# Patient Record
Sex: FEMALE | Race: WHITE | NOT HISPANIC OR LATINO | URBAN - METROPOLITAN AREA
[De-identification: names, ages, dates, MRNs, and addresses within clinical notes are randomized per-mention and may not be internally consistent; named-entity substitution may affect disease eponyms.]

---

## 2020-07-25 ENCOUNTER — TELEPHONE ENCOUNTER (OUTPATIENT)
Dept: URBAN - METROPOLITAN AREA CLINIC 13 | Facility: CLINIC | Age: 55
End: 2020-07-25

## 2020-07-25 RX ORDER — HYDROXYCHLOROQUINE SULFATE 200 MG/1
TAKE 1 TABLET DAILY TABLET ORAL
Refills: 0 | OUTPATIENT
End: 2016-11-14

## 2020-07-25 RX ORDER — DULOXETINE HCL 30 MG
TAKE 1 CAPSULE DAILY CAPSULE,DELAYED RELEASE (ENTERIC COATED) ORAL
Refills: 0 | OUTPATIENT
End: 2016-11-14

## 2020-07-25 RX ORDER — ESOMEPRAZOLE MAGNESIUM 40 MG
TAKE 1 CAPSULE DAILY AS NEEDED CAPSULE,DELAYED RELEASE (ENTERIC COATED) ORAL
Refills: 0 | OUTPATIENT
End: 2020-02-13

## 2020-07-26 ENCOUNTER — TELEPHONE ENCOUNTER (OUTPATIENT)
Dept: URBAN - METROPOLITAN AREA CLINIC 13 | Facility: CLINIC | Age: 55
End: 2020-07-26

## 2020-07-26 RX ORDER — CETIRIZINE HYDROCHLORIDE 10 MG/1
TAKE 1 CAPSULE DAILY AS NEEDED CAPSULE, LIQUID FILLED ORAL
Refills: 0 | Status: ACTIVE | COMMUNITY

## 2020-07-26 RX ORDER — RIFAXIMIN 550 MG/1
TAKE 1 TABLET 3 TIMES DAILY TABLET ORAL
Qty: 42 | Refills: 0 | Status: ACTIVE | COMMUNITY
Start: 2020-02-13

## 2020-07-26 RX ORDER — CALCIUM CITRATE/VITAMIN D3 315MG-6.25
TAKE 1 TABLET DAILY TABLET ORAL
Refills: 0 | Status: ACTIVE | COMMUNITY

## 2020-07-26 RX ORDER — PREDNISONE 20 MG/1
TAKE 3 TABLETS BY MOUTH ONCE DAILY FOR 3 DAYS THEN 2 TABLETS DAILY FOR TABLET ORAL
Qty: 18 | Refills: 0 | Status: ACTIVE | COMMUNITY
Start: 2019-12-05

## 2020-07-26 RX ORDER — FLUCONAZOLE 200 MG/1
TAKE 1 TABLET BY MOUTH ONCE DAILY TABLET ORAL
Qty: 14 | Refills: 0 | Status: ACTIVE | COMMUNITY
Start: 2019-12-30

## 2020-07-26 RX ORDER — CLOBETASOL PROPIONATE 0.5 MG/G
CREAM TOPICAL
Qty: 60 | Refills: 0 | Status: ACTIVE | COMMUNITY
Start: 2020-02-20

## 2020-07-26 RX ORDER — CYCLOBENZAPRINE HYDROCHLORIDE 10 MG/1
TABLET, FILM COATED ORAL
Qty: 30 | Refills: 0 | Status: ACTIVE | COMMUNITY
Start: 2020-03-27

## 2020-07-26 RX ORDER — CLOBETASOL PROPIONATE 0.5 MG/G
APPLY 1 GRAM TOPICALLY TO AFFECTED AREA TWICE DAILY CREAM TOPICAL
Qty: 30 | Refills: 0 | Status: ACTIVE | COMMUNITY
Start: 2019-08-07

## 2020-07-26 RX ORDER — AMOXICILLIN AND CLAVULANATE POTASSIUM 500; 125 MG/1; MG/1
TAKE 1 TABLET BY MOUTH TWICE DAILY TABLET, FILM COATED ORAL
Qty: 28 | Refills: 0 | Status: ACTIVE | COMMUNITY
Start: 2019-09-01

## 2020-07-26 RX ORDER — CHLORHEXIDINE GLUCONATE 4 %
TAKE 1 TABLET DAILY AS DIRECTED LIQUID (ML) TOPICAL
Refills: 0 | Status: ACTIVE | COMMUNITY

## 2021-10-14 ENCOUNTER — LAB OUTSIDE AN ENCOUNTER (OUTPATIENT)
Dept: URBAN - METROPOLITAN AREA CLINIC 113 | Facility: CLINIC | Age: 56
End: 2021-10-14

## 2021-10-14 ENCOUNTER — TELEPHONE ENCOUNTER (OUTPATIENT)
Dept: URBAN - METROPOLITAN AREA CLINIC 113 | Facility: CLINIC | Age: 56
End: 2021-10-14

## 2021-10-14 VITALS — HEIGHT: 62 IN | WEIGHT: 220 LBS | BODY MASS INDEX: 40.48 KG/M2

## 2021-10-14 RX ORDER — CETIRIZINE HYDROCHLORIDE 10 MG/1
TAKE 1 CAPSULE DAILY AS NEEDED CAPSULE, LIQUID FILLED ORAL
Refills: 0 | Status: ACTIVE | COMMUNITY

## 2021-10-14 RX ORDER — CLOBETASOL PROPIONATE 0.5 MG/G
APPLY 1 GRAM TOPICALLY TO AFFECTED AREA TWICE DAILY CREAM TOPICAL
Qty: 30 | Refills: 0 | Status: DISCONTINUED | COMMUNITY
Start: 2019-08-07

## 2021-10-14 RX ORDER — CYCLOBENZAPRINE HYDROCHLORIDE 10 MG/1
TABLET, FILM COATED ORAL
Qty: 30 | Refills: 0 | Status: DISCONTINUED | COMMUNITY
Start: 2020-03-27

## 2021-10-14 RX ORDER — CHLORHEXIDINE GLUCONATE 4 %
TAKE 1 TABLET DAILY AS DIRECTED LIQUID (ML) TOPICAL
Refills: 0 | Status: ACTIVE | COMMUNITY

## 2021-10-14 RX ORDER — CALCIUM CITRATE/VITAMIN D3 315MG-6.25
TAKE 1 TABLET DAILY TABLET ORAL
Refills: 0 | Status: DISCONTINUED | COMMUNITY

## 2021-10-14 RX ORDER — AMOXICILLIN AND CLAVULANATE POTASSIUM 500; 125 MG/1; MG/1
TAKE 1 TABLET BY MOUTH TWICE DAILY TABLET, FILM COATED ORAL
Qty: 28 | Refills: 0 | Status: DISCONTINUED | COMMUNITY
Start: 2019-09-01

## 2021-10-14 RX ORDER — PREDNISONE 20 MG/1
TAKE 3 TABLETS BY MOUTH ONCE DAILY FOR 3 DAYS THEN 2 TABLETS DAILY FOR TABLET ORAL
Qty: 18 | Refills: 0 | Status: DISCONTINUED | COMMUNITY
Start: 2019-12-05

## 2021-10-14 RX ORDER — FLUCONAZOLE 200 MG/1
TAKE 1 TABLET BY MOUTH ONCE DAILY TABLET ORAL
Qty: 14 | Refills: 0 | Status: ACTIVE | COMMUNITY
Start: 2019-12-30

## 2021-10-14 RX ORDER — SODIUM PICOSULFATE, MAGNESIUM OXIDE, AND ANHYDROUS CITRIC ACID 10; 3.5; 12 MG/160ML; G/160ML; G/160ML
160ML LIQUID ORAL
Qty: 1 BOTTLE | Refills: 0 | OUTPATIENT
Start: 2021-10-14 | End: 2021-10-15

## 2021-10-14 RX ORDER — RIFAXIMIN 550 MG/1
TAKE 1 TABLET 3 TIMES DAILY TABLET ORAL
Qty: 42 | Refills: 0 | Status: DISCONTINUED | COMMUNITY
Start: 2020-02-13

## 2021-10-19 ENCOUNTER — TELEPHONE ENCOUNTER (OUTPATIENT)
Dept: URBAN - METROPOLITAN AREA CLINIC 113 | Facility: CLINIC | Age: 56
End: 2021-10-19

## 2021-10-19 RX ORDER — FLUCONAZOLE 200 MG/1
TAKE 1 TABLET BY MOUTH ONCE DAILY TABLET ORAL
Qty: 14 | Refills: 0 | Status: ACTIVE | COMMUNITY
Start: 2019-12-30

## 2021-10-19 RX ORDER — CETIRIZINE HYDROCHLORIDE 10 MG/1
TAKE 1 CAPSULE DAILY AS NEEDED CAPSULE, LIQUID FILLED ORAL
Refills: 0 | Status: ACTIVE | COMMUNITY

## 2021-10-19 RX ORDER — CHLORHEXIDINE GLUCONATE 4 %
TAKE 1 TABLET DAILY AS DIRECTED LIQUID (ML) TOPICAL
Refills: 0 | Status: ACTIVE | COMMUNITY

## 2021-10-19 RX ORDER — POLYETHYLENE GLYCOL 3350, SODIUM CHLORIDE, SODIUM BICARBONATE, POTASSIUM CHLORIDE 420; 11.2; 5.72; 1.48 G/4L; G/4L; G/4L; G/4L
AS DIRECTED POWDER, FOR SOLUTION ORAL ONCE
Qty: 420 GM | Refills: 0 | OUTPATIENT
Start: 2021-10-19 | End: 2021-10-20

## 2021-11-22 ENCOUNTER — TELEPHONE ENCOUNTER (OUTPATIENT)
Dept: URBAN - METROPOLITAN AREA CLINIC 113 | Facility: CLINIC | Age: 56
End: 2021-11-22

## 2021-11-22 ENCOUNTER — OFFICE VISIT (OUTPATIENT)
Dept: URBAN - METROPOLITAN AREA SURGERY CENTER 25 | Facility: SURGERY CENTER | Age: 56
End: 2021-11-22

## 2021-11-24 ENCOUNTER — TELEPHONE ENCOUNTER (OUTPATIENT)
Dept: URBAN - METROPOLITAN AREA CLINIC 113 | Facility: CLINIC | Age: 56
End: 2021-11-24

## 2021-11-24 RX ORDER — POLYETHYLENE GLYCOL 3350, SODIUM CHLORIDE, SODIUM BICARBONATE, POTASSIUM CHLORIDE 420; 11.2; 5.72; 1.48 G/4L; G/4L; G/4L; G/4L
AS DIRECTED POWDER, FOR SOLUTION ORAL ONCE
Qty: 420 GM | Refills: 0 | OUTPATIENT

## 2021-12-22 ENCOUNTER — OFFICE VISIT (OUTPATIENT)
Dept: URBAN - METROPOLITAN AREA SURGERY CENTER 25 | Facility: SURGERY CENTER | Age: 56
End: 2021-12-22
Payer: COMMERCIAL

## 2021-12-22 DIAGNOSIS — K63.89 BACTERIAL OVERGROWTH SYNDROME: ICD-10-CM

## 2021-12-22 DIAGNOSIS — Z80.0 FAMILY HISTORY OF MALIGNANT NEOPLASM OF DIGESTIVE ORGANS: ICD-10-CM

## 2021-12-22 DIAGNOSIS — D12.0 ADENOMA OF CECUM: ICD-10-CM

## 2021-12-22 DIAGNOSIS — Z12.11 ENCOUNTER FOR SCREENING FOR MALIGNANT NEOPLASM OF COLON: ICD-10-CM

## 2021-12-22 PROCEDURE — G8907 PT DOC NO EVENTS ON DISCHARG: HCPCS | Performed by: INTERNAL MEDICINE

## 2021-12-22 PROCEDURE — 45385 COLONOSCOPY W/LESION REMOVAL: CPT | Performed by: INTERNAL MEDICINE

## 2021-12-22 PROCEDURE — 45380 COLONOSCOPY AND BIOPSY: CPT | Performed by: INTERNAL MEDICINE

## 2021-12-22 RX ORDER — FLUCONAZOLE 200 MG/1
TAKE 1 TABLET BY MOUTH ONCE DAILY TABLET ORAL
Qty: 14 | Refills: 0 | Status: ACTIVE | COMMUNITY
Start: 2019-12-30

## 2021-12-22 RX ORDER — POLYETHYLENE GLYCOL 3350, SODIUM CHLORIDE, SODIUM BICARBONATE, POTASSIUM CHLORIDE 420; 11.2; 5.72; 1.48 G/4L; G/4L; G/4L; G/4L
AS DIRECTED POWDER, FOR SOLUTION ORAL ONCE
Qty: 420 GM | Refills: 0 | Status: ACTIVE | COMMUNITY

## 2021-12-22 RX ORDER — CETIRIZINE HYDROCHLORIDE 10 MG/1
TAKE 1 CAPSULE DAILY AS NEEDED CAPSULE, LIQUID FILLED ORAL
Refills: 0 | Status: ACTIVE | COMMUNITY

## 2021-12-22 RX ORDER — CHLORHEXIDINE GLUCONATE 4 %
TAKE 1 TABLET DAILY AS DIRECTED LIQUID (ML) TOPICAL
Refills: 0 | Status: ACTIVE | COMMUNITY

## 2022-12-13 ENCOUNTER — LAB OUTSIDE AN ENCOUNTER (OUTPATIENT)
Dept: URBAN - METROPOLITAN AREA CLINIC 113 | Facility: CLINIC | Age: 57
End: 2022-12-13

## 2022-12-13 ENCOUNTER — OFFICE VISIT (OUTPATIENT)
Dept: URBAN - METROPOLITAN AREA CLINIC 113 | Facility: CLINIC | Age: 57
End: 2022-12-13
Payer: COMMERCIAL

## 2022-12-13 VITALS
HEART RATE: 77 BPM | HEIGHT: 62 IN | BODY MASS INDEX: 37.76 KG/M2 | WEIGHT: 205.2 LBS | RESPIRATION RATE: 20 BRPM | SYSTOLIC BLOOD PRESSURE: 127 MMHG | TEMPERATURE: 97.2 F | DIASTOLIC BLOOD PRESSURE: 81 MMHG

## 2022-12-13 DIAGNOSIS — R79.89 ELEVATED LFTS: ICD-10-CM

## 2022-12-13 DIAGNOSIS — R11.0 NAUSEA: ICD-10-CM

## 2022-12-13 DIAGNOSIS — R10.11 RUQ PAIN: ICD-10-CM

## 2022-12-13 DIAGNOSIS — K59.00 CONSTIPATION, UNSPECIFIED CONSTIPATION TYPE: ICD-10-CM

## 2022-12-13 PROCEDURE — 99214 OFFICE O/P EST MOD 30 MIN: CPT

## 2022-12-13 RX ORDER — FLUCONAZOLE 200 MG/1
TAKE 1 TABLET BY MOUTH ONCE DAILY TABLET ORAL
Qty: 14 | Refills: 0 | Status: ACTIVE | COMMUNITY
Start: 2019-12-30

## 2022-12-13 RX ORDER — ONDANSETRON 4 MG/1
1 TABLET ON THE TONGUE AND ALLOW TO DISSOLVE TABLET, ORALLY DISINTEGRATING ORAL
Qty: 30 | Refills: 0 | OUTPATIENT
Start: 2022-12-13

## 2022-12-13 RX ORDER — LINACLOTIDE 72 UG/1
1 CAPSULE AT LEAST 30 MINUTES BEFORE THE FIRST MEAL OF THE DAY ON AN EMPTY STOMACH CAPSULE, GELATIN COATED ORAL ONCE A DAY
Qty: 24 | Refills: 0
Start: 2022-12-13 | End: 2023-01-06

## 2022-12-13 RX ORDER — CETIRIZINE HYDROCHLORIDE 10 MG/1
TAKE 1 CAPSULE DAILY AS NEEDED CAPSULE, LIQUID FILLED ORAL
Refills: 0 | Status: ACTIVE | COMMUNITY

## 2022-12-13 RX ORDER — CHLORHEXIDINE GLUCONATE 4 %
TAKE 1 TABLET DAILY AS DIRECTED LIQUID (ML) TOPICAL
Refills: 0 | Status: ON HOLD | COMMUNITY

## 2022-12-13 RX ORDER — POLYETHYLENE GLYCOL 3350, SODIUM CHLORIDE, SODIUM BICARBONATE, POTASSIUM CHLORIDE 420; 11.2; 5.72; 1.48 G/4L; G/4L; G/4L; G/4L
AS DIRECTED POWDER, FOR SOLUTION ORAL ONCE
Qty: 420 GM | Refills: 0 | Status: ON HOLD | COMMUNITY

## 2022-12-13 NOTE — HPI-TODAY'S VISIT:
Ms. Argueta is a 57-year-old woman presenting for evaluation of nausea and abdominal pain.  Normal last seen 12/22/2021 for direct access colonoscopy.  Notable for removal of 1 small polyp in the cecum, and 1 small polyp in the sigmoid, diverticulosis of the sigmoid, grade 1 internal nonbleeding hemorrhoids.  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  Repeat colonoscopy in 5 years.  [place].  Per note from primary care, abdominal pain had subsided but liver enzymes were elevated.  Elevated liver enzyme work-up deferred to GI, and trial sucralfate for nausea.  12/7/2022 labs:CBC within normal limits, CMP within normal limits, EBV antibody IgG elevated, EBV nuclear antigen antibody IgG elevated, LAUREN negative, hep A IgM negative, hep B surface antigen negative, hep B core antibody negative, hep C antibody negative.  She reports on 11/11/2022 she was seen at [facility] ER.  She woke up at 3:30 in the morning with severe abdominal pain and ended up visiting the emergency room.  She was evaluated with right upper quadrant ultrasound and CT abdomen pelvis with contrast.  Exam was most consistent with gallbladder etiology.  She had a positive [place] sign. Her liver enzymes and bilirubin were mildly elevated during workup. She was evaluated by surgery, and agreed to outpatient follow-up with imaging versus gallbladder removal.  RUQ US: 1.9 x 1.7 x 2.1 cm echogenic focus within right liver, likely represents hemangioma, consider CT abd with and without, normal sonographic evaluation of gallbladder, CBD 8 mm CT abd/pelvis with contrast: echogenic focus within right lobe of the liver corresponding to hemangioma, several hepatic cysts, no acute abnormality   11/11/22 labs: t bili 1.9, , ,   She reports RUQ pain has resolved for the most part aside from some mild tenderness with palpation. Nausea only right after eating, but no vomiting. She reports constipation with a bowel movement every 3-4 days with straining, despite using MiraLAX capful daily. Denies blood, mucous. She contributes constipation to new diet, Optavia, which is a soy based diet.

## 2022-12-29 ENCOUNTER — WEB ENCOUNTER (OUTPATIENT)
Dept: URBAN - METROPOLITAN AREA CLINIC 113 | Facility: CLINIC | Age: 57
End: 2022-12-29

## 2022-12-29 RX ORDER — PLECANATIDE 3 MG/1
1 TABLET TABLET ORAL ONCE A DAY
Qty: 30 | Refills: 0 | OUTPATIENT
Start: 2023-01-04 | End: 2023-02-03

## 2023-01-08 ENCOUNTER — WEB ENCOUNTER (OUTPATIENT)
Dept: URBAN - METROPOLITAN AREA CLINIC 113 | Facility: CLINIC | Age: 58
End: 2023-01-08

## 2023-01-11 ENCOUNTER — TELEPHONE ENCOUNTER (OUTPATIENT)
Dept: URBAN - METROPOLITAN AREA CLINIC 113 | Facility: CLINIC | Age: 58
End: 2023-01-11

## 2023-01-24 ENCOUNTER — LAB OUTSIDE AN ENCOUNTER (OUTPATIENT)
Dept: URBAN - METROPOLITAN AREA CLINIC 113 | Facility: CLINIC | Age: 58
End: 2023-01-24

## 2023-01-24 ENCOUNTER — OFFICE VISIT (OUTPATIENT)
Dept: URBAN - METROPOLITAN AREA CLINIC 113 | Facility: CLINIC | Age: 58
End: 2023-01-24
Payer: COMMERCIAL

## 2023-01-24 VITALS
DIASTOLIC BLOOD PRESSURE: 79 MMHG | TEMPERATURE: 97.6 F | HEIGHT: 62 IN | WEIGHT: 206 LBS | RESPIRATION RATE: 18 BRPM | SYSTOLIC BLOOD PRESSURE: 125 MMHG | HEART RATE: 77 BPM | BODY MASS INDEX: 37.91 KG/M2

## 2023-01-24 DIAGNOSIS — K59.00 CONSTIPATION, UNSPECIFIED CONSTIPATION TYPE: ICD-10-CM

## 2023-01-24 DIAGNOSIS — R10.10 PAIN OF UPPER ABDOMEN: ICD-10-CM

## 2023-01-24 DIAGNOSIS — R11.0 NAUSEA: ICD-10-CM

## 2023-01-24 DIAGNOSIS — K76.89 LIVER CYST: ICD-10-CM

## 2023-01-24 PROBLEM — 85057007: Status: ACTIVE | Noted: 2023-01-24

## 2023-01-24 PROBLEM — 14760008: Status: ACTIVE | Noted: 2022-12-13

## 2023-01-24 PROCEDURE — 99214 OFFICE O/P EST MOD 30 MIN: CPT

## 2023-01-24 RX ORDER — CHLORHEXIDINE GLUCONATE 4 %
TAKE 1 TABLET DAILY AS DIRECTED LIQUID (ML) TOPICAL
Refills: 0 | Status: ON HOLD | COMMUNITY

## 2023-01-24 RX ORDER — PLECANATIDE 3 MG/1
1 TABLET TABLET ORAL ONCE A DAY
Qty: 30 | Refills: 0 | Status: ON HOLD | COMMUNITY
Start: 2023-01-04 | End: 2023-02-03

## 2023-01-24 RX ORDER — CETIRIZINE HYDROCHLORIDE 10 MG/1
TAKE 1 CAPSULE DAILY AS NEEDED CAPSULE, LIQUID FILLED ORAL
Refills: 0 | Status: ACTIVE | COMMUNITY

## 2023-01-24 RX ORDER — FLUCONAZOLE 200 MG/1
TAKE 1 TABLET BY MOUTH ONCE DAILY TABLET ORAL
Qty: 14 | Refills: 0 | Status: ON HOLD | COMMUNITY
Start: 2019-12-30

## 2023-01-24 RX ORDER — ONDANSETRON 4 MG/1
1 TABLET ON THE TONGUE AND ALLOW TO DISSOLVE TABLET, ORALLY DISINTEGRATING ORAL
Qty: 30 | Refills: 0 | Status: ON HOLD | COMMUNITY
Start: 2022-12-13

## 2023-01-24 RX ORDER — POLYETHYLENE GLYCOL 3350, SODIUM CHLORIDE, SODIUM BICARBONATE, POTASSIUM CHLORIDE 420; 11.2; 5.72; 1.48 G/4L; G/4L; G/4L; G/4L
AS DIRECTED POWDER, FOR SOLUTION ORAL ONCE
Qty: 420 GM | Refills: 0 | Status: ON HOLD | COMMUNITY

## 2023-01-24 NOTE — HPI-TODAY'S VISIT:
Ms. Landin is a 57-year-old woman presenting for a follow-up after cholecystectomy. Last seen 12/13/2022 for hospital follow-up for right upper quadrant pain.  Evaluation with right upper quadrant ultrasound and CT abdomen and pelvis most consistent with gallbladder etiology.  She was consulted for surgery, but decided to continue work-up outpatient versus a cholecystectomy.  Recommended CT scan, liver protocol, to evaluate liver cyst/meningioma noted on ultrasound and CT.  Recommended HIDA to evaluate gallbladder function and bile ducts.  Regarding elevated LFTs in the hospital, repeat labs from 12/7/2022 showed normalized levels.  We will repeat labs in 3 months to ensure liver enzymes stay normalized.  Regarding constipation, trial Linzess 72 mcg daily.  Trial Zofran for nausea. 12/20/2022 HIDA scan:No evidence of acute cholecystitis, but gallbladder ejection fraction below normal limits at 28%. Repeat CT scan, liver protocol was denied by insurance. Patient presents today to follow-up.  She had her gallbladder removed 1/13/2023, but reports still feeling the same.  She reports pain in the left upper quadrant that spreads to the right upper quadrant.  She feels nauseated all the time and occasionally vomit.  The pain occurs primarily and not only with eating.  She has not taking Zofran because it knocks her out, so she has just been living with the symptoms.  She has never had an EGD.  No history of diabetes. She reports her bowel movements have been very constipated.  She has had to disimpact herself on 2 occasions since the surgery.  She tried Trulance 3 mg and it caused "massive liquid diarrhea ", which is the same reaction she had with Linzess 72 mcg.  She reports firm hard stool when she has a bowel movement with no blood, mucus.  MiraLAX works better than the prescription medications, but it still not great.  She reports the same with milk of magnesia.

## 2023-01-25 ENCOUNTER — TELEPHONE ENCOUNTER (OUTPATIENT)
Dept: URBAN - METROPOLITAN AREA CLINIC 113 | Facility: CLINIC | Age: 58
End: 2023-01-25

## 2023-02-22 ENCOUNTER — TELEPHONE ENCOUNTER (OUTPATIENT)
Dept: URBAN - METROPOLITAN AREA CLINIC 113 | Facility: CLINIC | Age: 58
End: 2023-02-22

## 2023-03-09 ENCOUNTER — OFFICE VISIT (OUTPATIENT)
Dept: URBAN - METROPOLITAN AREA SURGERY CENTER 25 | Facility: SURGERY CENTER | Age: 58
End: 2023-03-09

## 2023-04-07 ENCOUNTER — OFFICE VISIT (OUTPATIENT)
Dept: URBAN - METROPOLITAN AREA CLINIC 113 | Facility: CLINIC | Age: 58
End: 2023-04-07

## 2023-12-14 ENCOUNTER — LAB OUTSIDE AN ENCOUNTER (OUTPATIENT)
Dept: URBAN - METROPOLITAN AREA CLINIC 107 | Facility: CLINIC | Age: 58
End: 2023-12-14

## 2023-12-14 ENCOUNTER — CLAIMS CREATED FROM THE CLAIM WINDOW (OUTPATIENT)
Dept: URBAN - METROPOLITAN AREA CLINIC 107 | Facility: CLINIC | Age: 58
End: 2023-12-14
Payer: COMMERCIAL

## 2023-12-14 VITALS
TEMPERATURE: 98.2 F | WEIGHT: 187 LBS | HEIGHT: 62 IN | BODY MASS INDEX: 34.41 KG/M2 | HEART RATE: 80 BPM | SYSTOLIC BLOOD PRESSURE: 137 MMHG | DIASTOLIC BLOOD PRESSURE: 79 MMHG | RESPIRATION RATE: 18 BRPM

## 2023-12-14 DIAGNOSIS — R10.11 RIGHT UPPER QUADRANT PAIN: ICD-10-CM

## 2023-12-14 DIAGNOSIS — R11.0 NAUSEA: ICD-10-CM

## 2023-12-14 DIAGNOSIS — K76.89 HEPATIC CYST: ICD-10-CM

## 2023-12-14 DIAGNOSIS — R10.13 EPIGASTRIC PAIN: ICD-10-CM

## 2023-12-14 PROBLEM — 85057007: Status: ACTIVE | Noted: 2023-12-14

## 2023-12-14 PROCEDURE — 99214 OFFICE O/P EST MOD 30 MIN: CPT

## 2023-12-14 RX ORDER — RABEPRAZOLE SODIUM 20 MG/1
1 TABLET TABLET, DELAYED RELEASE ORAL ONCE A DAY
Qty: 90 TABLET | Refills: 1 | OUTPATIENT
Start: 2023-12-14

## 2023-12-14 RX ORDER — CETIRIZINE HYDROCHLORIDE 10 MG/1
TAKE 1 CAPSULE DAILY AS NEEDED CAPSULE, LIQUID FILLED ORAL
Refills: 0 | Status: ACTIVE | COMMUNITY

## 2023-12-14 NOTE — HPI-OTHER HISTORIES
12/20/2022 HIDA scan:No evidence of acute cholecystitis, but gallbladder ejection fraction below normal limits at 28%.Repeat CT scan, liver protocol was denied by insurance. CT abdomen pelvis with contrast from 11/11/2022 showed a 2.5 cm cyst lateral segment left lobe liver.  low attenuation foci within both right and left lobes of the liver. Felt likely to be small cyst. 1.8 cm Attenuation focus right lobe of the liver corresponding to the echogenic focus seen on the ultrasound with some peripheral puddling of contrast consistent with cavernous hemangioma.

## 2023-12-14 NOTE — HPI-TODAY'S VISIT:
Ms. Landin is a 57-year-old woman presenting for long interval follow-up for nausea and liver cysts.   She was last seen 1/24/2023 for follow-up after cholecystectomy (January 13, 2023).  At that point she reported no change in her symptoms she was still experiencing upper abdominal pain and nausea.  A 4-hour gastric imaging study was ordered to rule out gastroparesis or delayed gastric emptying.  EGD was ordered to rule out GERD, PUD, H. pylori.  Patient was offered antiemetic medication but she declined as she felt it made her feel tired and drowsy.  As far as her constipation she was recommend MiraLAX 1 capful daily with 15 cc Milk of Magnesia every other night.  She had tried Linzess 72 mcg and 23 mg which she reported close "too much for her system".  Regarding liver cyst that were noted on CT and ultrasoun there was no further imaging required at that time.  Her liver enzymes had returned to normal labs were planned to be repeated in 1 month from that visit.   It appears she canceled her EGD that was scheduled to be completed in March 2023.  Today she presents with continued complaints of nausea and abdominal pain.  She reports in May 2023 that she had a sleeve gastrectomy.  They started her on Pepcid but it was not helpful for her symptoms.  She was reports frustration as she has not lost any weight that she feels is that she is not eating anything. She feels bloated at times. She notices with her constipation and a trial of MiraLAX she will have "blowout diarrhea".  She has started drinking colostrum cow powder and reports her bowels are moving regularly. She endorses occasionally blood per rectum after firm bowel bowel movement with straining.  She also wanted us to note that she has seen her OB/GYN as during intercourse her  said he felt something stabbing him and her OB/GYN reported that she does have either mesh or stitch in that area. She had her gastric emptying study on February 7, 2023 that was reported normal. She presents today with her CT abdomen pelvis with contrast completed 11/9/2023 that revealed multiple hepatic cysts.  Subcentimeter hypodensities that are too small to characterize.  Avidly enhancing, heterogenous right hepatic lobe lesion measuring 2.4 x 2.3 cm.  This was suspected to be a hepatic hemangioma.  It was recommended further evaluation with nonemergent/outpatient MRI abdomen with and without contrast liver protocol.  Normal pancreas.  GI with scattered colonic diverticula.  Paracervicals changes of a sleeve gastrectomy.  Tiny hiatal hernia.

## 2023-12-16 ENCOUNTER — LAB OUTSIDE AN ENCOUNTER (OUTPATIENT)
Dept: URBAN - METROPOLITAN AREA CLINIC 107 | Facility: CLINIC | Age: 58
End: 2023-12-16

## 2023-12-20 ENCOUNTER — CLAIMS CREATED FROM THE CLAIM WINDOW (OUTPATIENT)
Dept: URBAN - METROPOLITAN AREA SURGERY CENTER 25 | Facility: SURGERY CENTER | Age: 58
End: 2023-12-20
Payer: COMMERCIAL

## 2023-12-20 ENCOUNTER — CLAIMS CREATED FROM THE CLAIM WINDOW (OUTPATIENT)
Dept: URBAN - METROPOLITAN AREA SURGERY CENTER 25 | Facility: SURGERY CENTER | Age: 58
End: 2023-12-20

## 2023-12-20 ENCOUNTER — CLAIMS CREATED FROM THE CLAIM WINDOW (OUTPATIENT)
Dept: URBAN - METROPOLITAN AREA CLINIC 4 | Facility: CLINIC | Age: 58
End: 2023-12-20
Payer: COMMERCIAL

## 2023-12-20 DIAGNOSIS — K31.89 OTHER DISEASES OF STOMACH AND DUODENUM: ICD-10-CM

## 2023-12-20 DIAGNOSIS — K44.9 HIATAL HERNIA: ICD-10-CM

## 2023-12-20 DIAGNOSIS — K31.9 ERYTHEMA OF GASTRIC ANTRUM: ICD-10-CM

## 2023-12-20 DIAGNOSIS — R11.0 NAUSEA: ICD-10-CM

## 2023-12-20 DIAGNOSIS — R10.13 EPIGASTRIC ABDOMINAL PAIN: ICD-10-CM

## 2023-12-20 DIAGNOSIS — K22.89 OTHER SPECIFIED DISEASES OF ESOPHAGUS: ICD-10-CM

## 2023-12-20 DIAGNOSIS — Z98.84 BARIATRIC SURGERY STATUS: ICD-10-CM

## 2023-12-20 PROCEDURE — G8907 PT DOC NO EVENTS ON DISCHARG: HCPCS | Performed by: INTERNAL MEDICINE

## 2023-12-20 PROCEDURE — 00731 ANES UPR GI NDSC PX NOS: CPT | Performed by: NURSE ANESTHETIST, CERTIFIED REGISTERED

## 2023-12-20 PROCEDURE — 88312 SPECIAL STAINS GROUP 1: CPT | Performed by: PATHOLOGY

## 2023-12-20 PROCEDURE — 43239 EGD BIOPSY SINGLE/MULTIPLE: CPT | Performed by: INTERNAL MEDICINE

## 2023-12-20 PROCEDURE — 88305 TISSUE EXAM BY PATHOLOGIST: CPT | Performed by: PATHOLOGY

## 2023-12-20 PROCEDURE — 00731 ANES UPR GI NDSC PX NOS: CPT | Performed by: ANESTHESIOLOGY

## 2023-12-20 RX ORDER — RABEPRAZOLE SODIUM 20 MG/1
1 TABLET TABLET, DELAYED RELEASE ORAL ONCE A DAY
Qty: 90 TABLET | Refills: 1 | Status: ACTIVE | COMMUNITY
Start: 2023-12-14

## 2023-12-20 RX ORDER — CETIRIZINE HYDROCHLORIDE 10 MG/1
TAKE 1 CAPSULE DAILY AS NEEDED CAPSULE, LIQUID FILLED ORAL
Refills: 0 | Status: ACTIVE | COMMUNITY

## 2023-12-28 ENCOUNTER — OFFICE VISIT (OUTPATIENT)
Dept: URBAN - METROPOLITAN AREA SURGERY CENTER 25 | Facility: SURGERY CENTER | Age: 58
End: 2023-12-28

## 2024-02-01 ENCOUNTER — OV EP (OUTPATIENT)
Dept: URBAN - METROPOLITAN AREA CLINIC 107 | Facility: CLINIC | Age: 59
End: 2024-02-01
Payer: COMMERCIAL

## 2024-02-01 VITALS — BODY MASS INDEX: 34.41 KG/M2 | HEIGHT: 62 IN | WEIGHT: 187 LBS | TEMPERATURE: 97.8 F

## 2024-02-01 DIAGNOSIS — R11.0 NAUSEA: ICD-10-CM

## 2024-02-01 DIAGNOSIS — K76.89 HEPATIC CYST: ICD-10-CM

## 2024-02-01 DIAGNOSIS — R10.13 EPIGASTRIC PAIN: ICD-10-CM

## 2024-02-01 DIAGNOSIS — R10.11 RIGHT UPPER QUADRANT PAIN: ICD-10-CM

## 2024-02-01 PROCEDURE — 99214 OFFICE O/P EST MOD 30 MIN: CPT

## 2024-02-01 RX ORDER — CETIRIZINE HYDROCHLORIDE 10 MG/1
TAKE 1 CAPSULE DAILY AS NEEDED CAPSULE, LIQUID FILLED ORAL
Refills: 0 | Status: ACTIVE | COMMUNITY

## 2024-02-01 RX ORDER — LORATADINE 10 MG/1
1 TABLET TABLET ORAL ONCE A DAY
Status: ACTIVE | COMMUNITY

## 2024-02-01 RX ORDER — AMITRIPTYLINE HYDROCHLORIDE 10 MG/1
1 TABLET AT BEDTIME FOR 7 DAYS THEN INCREASE TO 2 TABLETS TABLET, FILM COATED ORAL
Qty: 60 | Refills: 0 | OUTPATIENT
Start: 2024-02-04

## 2024-02-01 RX ORDER — RABEPRAZOLE SODIUM 20 MG/1
1 TABLET TABLET, DELAYED RELEASE ORAL ONCE A DAY
Qty: 90 TABLET | Refills: 1 | Status: ACTIVE | COMMUNITY
Start: 2023-12-14

## 2024-02-01 RX ORDER — RABEPRAZOLE SODIUM 20 MG/1
1 TABLET TABLET, DELAYED RELEASE ORAL ONCE A DAY
Qty: 90 TABLET | Refills: 1 | OUTPATIENT

## 2024-02-01 RX ORDER — MULTIVIT-MIN/IRON/FOLIC ACID/K 45-800-120
AS DIRECTED CAPSULE ORAL
Status: ACTIVE | COMMUNITY

## 2024-02-01 NOTE — HPI-TODAY'S VISIT:
Ms. Argueta is a 58-year-old woman with a history of lupus, dyslipidemia, fibromyalgia, diverticulosis, and internal hemorrhoids presenting for follow-up regarding her nausea and epigastric pain.  She was last seen in office 12/14/2023 for evaluation regarding nausea and epigastric pain.  I recommend that she reschedule her EGD for evaluation.  That was suspected there is a functional component with her symptoms.  She was also to start rabeprazole 20 mg p.o. daily.  She declined antiemetics at this time.  She also presented with a recent CT imaging that showed multiple subcentimeter hepatic cysts and a 2 and half centimeter lesion in the right hepatic lobe consider be hemangioma.  Similar to previous CT a/p from 1 year prior.  No further imaging was required with patient [facility] MRI.  This was ordered Her EGD performed 12/20/2023 irregular Z-line otherwise unremarkable esophagus.  Small hiatal hernia.  Changes of a sleeve gastrectomy was found.  Nonerosive gastropathy this was biopsied.  Normal duodenum pathology revealed foveolar hyperplasia.  No evidence of H. pylori or intestinal metaplasia.  Her MRI abdomen with and without contrast completed on 12/28/2023 showed previously described right lobe lesion is a segment 6 hemangioma measuring 1.9 x 1.5 cm several hepatic cysts, largest in segment 4 measuring to 2.5 cm. Today she reports no improvement in her symptoms. Constantly nauseaous and unable to eat. Her weight has been stable. Bowels are moving regularly without blood per rectum or melena. She reports she has not followed up with bariatric surgery in 8 months.

## 2024-05-02 ENCOUNTER — OFFICE VISIT (OUTPATIENT)
Dept: URBAN - METROPOLITAN AREA CLINIC 107 | Facility: CLINIC | Age: 59
End: 2024-05-02

## 2024-06-06 ENCOUNTER — DASHBOARD ENCOUNTERS (OUTPATIENT)
Age: 59
End: 2024-06-06

## 2024-06-06 ENCOUNTER — OFFICE VISIT (OUTPATIENT)
Dept: URBAN - METROPOLITAN AREA CLINIC 107 | Facility: CLINIC | Age: 59
End: 2024-06-06
Payer: COMMERCIAL

## 2024-06-06 VITALS
HEIGHT: 62 IN | SYSTOLIC BLOOD PRESSURE: 109 MMHG | TEMPERATURE: 98.1 F | WEIGHT: 192 LBS | DIASTOLIC BLOOD PRESSURE: 73 MMHG | HEART RATE: 80 BPM | BODY MASS INDEX: 35.33 KG/M2

## 2024-06-06 DIAGNOSIS — R10.11 RIGHT UPPER QUADRANT PAIN: ICD-10-CM

## 2024-06-06 DIAGNOSIS — R10.13 EPIGASTRIC PAIN: ICD-10-CM

## 2024-06-06 DIAGNOSIS — K59.09 CHANGE IN BOWEL MOVEMENTS INTERMITTENT CONSTIPATION. URGENCY IN THE MORNING.: ICD-10-CM

## 2024-06-06 DIAGNOSIS — R11.0 NAUSEA: ICD-10-CM

## 2024-06-06 PROCEDURE — 99213 OFFICE O/P EST LOW 20 MIN: CPT

## 2024-06-06 RX ORDER — LORATADINE 10 MG/1
1 TABLET TABLET ORAL ONCE A DAY
Status: ACTIVE | COMMUNITY

## 2024-06-06 RX ORDER — MULTIVIT-MIN/IRON/FOLIC ACID/K 45-800-120
AS DIRECTED CAPSULE ORAL
Status: ACTIVE | COMMUNITY

## 2024-06-06 RX ORDER — CETIRIZINE HYDROCHLORIDE 10 MG/1
TAKE 1 CAPSULE DAILY AS NEEDED CAPSULE, LIQUID FILLED ORAL
Refills: 0 | Status: ACTIVE | COMMUNITY

## 2024-06-06 NOTE — HPI-TODAY'S VISIT:
Ms. Argueta is a 58-year-old woman with a history of lupus, status post sleeve gastrectomy, dyslipidemia, fibromyalgia, diverticulosis, and internal hemorrhoids presenting for follow-up regarding her upper abdominal pain.  She was last seen in office on 2/1/2024 for follow-up after her EGD which showed a small hiatal hernia otherwise unremarkable.  She had continued complaints of nausea, right upper quadrant pain, and epigastric pain.  Regarding her hepatic lesion an MRI with and without contrast showed hepatic hemangioma otherwise unremarkable.  No further workup or surveillance imaging required.  Is recommended that she follow-up with bariatric surgery and a trial of nonnarcotic pain modulator in the interim.  Today she reports that she made an appointment with her bariatric surgery team (Dr. Fernandez) they are having her keep a food log as she reports she is unable to eat however continues to gain weight.  They also started her on Pepcid which she reports has provided some relief of the symptoms.  She had an episode of constipation after going to the beach she became dehydrated, this has improved.

## 2024-12-01 ENCOUNTER — CLAIMS CREATED FROM THE CLAIM WINDOW (OUTPATIENT)
Dept: URBAN - METROPOLITAN AREA MEDICAL CENTER 19 | Facility: MEDICAL CENTER | Age: 59
End: 2024-12-01
Payer: COMMERCIAL

## 2024-12-01 DIAGNOSIS — R19.7 ACUTE DIARRHEA: ICD-10-CM

## 2024-12-01 DIAGNOSIS — K85.80 OTHER ACUTE PANCREATITIS WITHOUT NECROSIS OR INFECTION: ICD-10-CM

## 2024-12-01 DIAGNOSIS — R63.4 ABNORMAL INTENTIONAL WEIGHT LOSS: ICD-10-CM

## 2024-12-01 DIAGNOSIS — K86.89 OTHER SPECIFIED DISEASES OF PANCREAS: ICD-10-CM

## 2024-12-01 PROCEDURE — 99222 1ST HOSP IP/OBS MODERATE 55: CPT | Performed by: INTERNAL MEDICINE

## 2024-12-01 PROCEDURE — 99254 IP/OBS CNSLTJ NEW/EST MOD 60: CPT | Performed by: INTERNAL MEDICINE

## 2024-12-02 ENCOUNTER — CLAIMS CREATED FROM THE CLAIM WINDOW (OUTPATIENT)
Dept: URBAN - METROPOLITAN AREA MEDICAL CENTER 19 | Facility: MEDICAL CENTER | Age: 59
End: 2024-12-02
Payer: COMMERCIAL

## 2024-12-02 DIAGNOSIS — R19.7 ACUTE DIARRHEA: ICD-10-CM

## 2024-12-02 DIAGNOSIS — K85.80 OTHER ACUTE PANCREATITIS WITHOUT NECROSIS OR INFECTION: ICD-10-CM

## 2024-12-02 DIAGNOSIS — Z90.49 ABSENCE OF GALLBLADDER: ICD-10-CM

## 2024-12-02 PROCEDURE — 99232 SBSQ HOSP IP/OBS MODERATE 35: CPT | Performed by: INTERNAL MEDICINE

## 2024-12-02 PROCEDURE — 99231 SBSQ HOSP IP/OBS SF/LOW 25: CPT | Performed by: INTERNAL MEDICINE

## 2024-12-03 ENCOUNTER — LAB OUTSIDE AN ENCOUNTER (OUTPATIENT)
Dept: URBAN - METROPOLITAN AREA CLINIC 113 | Facility: CLINIC | Age: 59
End: 2024-12-03

## 2024-12-03 ENCOUNTER — TELEPHONE ENCOUNTER (OUTPATIENT)
Dept: URBAN - METROPOLITAN AREA CLINIC 113 | Facility: CLINIC | Age: 59
End: 2024-12-03

## 2024-12-19 ENCOUNTER — OFFICE VISIT (OUTPATIENT)
Dept: URBAN - METROPOLITAN AREA CLINIC 107 | Facility: CLINIC | Age: 59
End: 2024-12-19

## 2024-12-19 VITALS
BODY MASS INDEX: 36.99 KG/M2 | DIASTOLIC BLOOD PRESSURE: 88 MMHG | HEIGHT: 62 IN | HEART RATE: 85 BPM | WEIGHT: 201 LBS | SYSTOLIC BLOOD PRESSURE: 124 MMHG | TEMPERATURE: 98.1 F

## 2024-12-19 RX ORDER — PANTOPRAZOLE SODIUM 40 MG/1
1 TABLET 1/2 TO 1 HOUR BEFORE MORNING MEAL TABLET, DELAYED RELEASE ORAL ONCE A DAY
Status: ACTIVE | COMMUNITY

## 2024-12-19 RX ORDER — CETIRIZINE HYDROCHLORIDE 10 MG/1
TAKE 1 CAPSULE DAILY AS NEEDED CAPSULE, LIQUID FILLED ORAL
Refills: 0 | Status: ACTIVE | COMMUNITY

## 2024-12-19 RX ORDER — MULTIVIT-MIN/IRON/FOLIC ACID/K 45-800-120
AS DIRECTED CAPSULE ORAL
Status: ACTIVE | COMMUNITY

## 2024-12-19 RX ORDER — NICOTINE 14MG/24HR
AS DIRECTED PATCH, TRANSDERMAL 24 HOURS TRANSDERMAL
Status: ACTIVE | COMMUNITY

## 2024-12-19 RX ORDER — RIFAXIMIN 550 MG/1
1 TABLET TABLET ORAL THREE TIMES A DAY
Qty: 42 TABLET | Refills: 0 | OUTPATIENT
Start: 2024-12-19 | End: 2025-01-02

## 2024-12-19 RX ORDER — FAMOTIDINE 20 MG/1
1 TABLET AT BEDTIME AS NEEDED TABLET, FILM COATED ORAL ONCE A DAY
Status: ACTIVE | COMMUNITY

## 2024-12-19 NOTE — HPI-TODAY'S VISIT:
Ms. Argueta is a 59-year-old woman with a history of lupus, sleeve gastrectomy, dyslipidemia, fibromyalgia, presenting for hospital follow-up.  She was last seen in office in 6/6/2024 for follow-up for chronic epigastric, right upper quadrant pain and nausea. She did f/u with bariatric surgery who was hainvg her continue daily PPI and start pepcid, she has noticed some improvement. She had also started compounded semaglutide  denies side effect at that visit. History of adenomatous colon polyps due surveillance in 2026.  She was admitted to [facility] on 11/30/2020 for after presenting to the ER with complaints of severe epigastric pain associate with nausea and vomiting which had been ongoing for 5 days.  On arrival to the ER noted to have mild leukocytosis 12.5 and lipase of 1700.  CT scan of the abdomen pelvis showed mild pancreatic duct dilatation to 5 mm concerning for acute pancreatitis she was started on IV fluids and given her history of acute cholecystectomy, MRCP was ordered to evaluate for possible biliary obstruction with the pancreatitis.  Given chronic history of diarrhea consider possible underlying autoimmune history tissue transaminase IgA levels were obtained to rule out celiac disease.  Her MRCP was normal in appearance without any abrupt tapering her dilations in the pancreatic duct felt to be normal/consistent with age.  Unchanged from 1 year prior.  No masses seen.  She was started on Xifaxan 550 mg 3 times a day for 14 days for possible SIBO..  It was recommended that upper GI endoscopy with gastric and duodenal biopsies be obtained as well as colonoscopy with intubation of the terminal ileum with random biopsies.  Today she reports she is feeling much better since completing Xifaxin. Her bowels are moving regularly without blood per rectum or melena. Weight has remained stable since last office visit, she is having less abdominal bloating.

## 2025-05-29 ENCOUNTER — OFFICE VISIT (OUTPATIENT)
Dept: URBAN - METROPOLITAN AREA CLINIC 107 | Facility: CLINIC | Age: 60
End: 2025-05-29
Payer: COMMERCIAL

## 2025-05-29 DIAGNOSIS — R14.0 ABDOMINAL BLOATING: ICD-10-CM

## 2025-05-29 DIAGNOSIS — R11.0 NAUSEA: ICD-10-CM

## 2025-05-29 DIAGNOSIS — K85.90 ACUTE PANCREATITIS WITHOUT INFECTION OR NECROSIS, UNSPECIFIED PANCREATITIS TYPE: ICD-10-CM

## 2025-05-29 DIAGNOSIS — K52.9 CHRONIC DIARRHEA OF UNKNOWN ORIGIN: ICD-10-CM

## 2025-05-29 PROCEDURE — 99213 OFFICE O/P EST LOW 20 MIN: CPT

## 2025-05-29 RX ORDER — PANTOPRAZOLE SODIUM 40 MG/1
1 TABLET 1/2 TO 1 HOUR BEFORE MORNING MEAL TABLET, DELAYED RELEASE ORAL ONCE A DAY
Status: ACTIVE | COMMUNITY

## 2025-05-29 RX ORDER — CETIRIZINE HYDROCHLORIDE 10 MG/1
TAKE 1 CAPSULE DAILY AS NEEDED CAPSULE, LIQUID FILLED ORAL
Refills: 0 | Status: ACTIVE | COMMUNITY

## 2025-05-29 RX ORDER — NICOTINE 14MG/24HR
AS DIRECTED PATCH, TRANSDERMAL 24 HOURS TRANSDERMAL
Status: ACTIVE | COMMUNITY

## 2025-05-29 RX ORDER — FAMOTIDINE 20 MG/1
1 TABLET AT BEDTIME AS NEEDED TABLET, FILM COATED ORAL ONCE A DAY
Status: ACTIVE | COMMUNITY

## 2025-05-29 NOTE — HPI-TODAY'S VISIT:
Ms. Argueta is a 59-year-old woman with a history of lupus, sleeve gastrectomy, dyslipidemia, fibromyalgia presenting for follow-up regarding her history of pancreatitis with unknown etiology and abdominal bloating.  She was last seen in office on 12/19/2024 for follow-up after recent hospitalization with labs consistent with possible pancreatitis, her MRCP was normal in appearance without any abrupt tapering or dilatation of the pancreatic duct and unchanged from 1 year prior.  Her symptoms had overall resolved following treatment with Xifaxan.  She had noticed over the last week some abdominal bloating.  Xifaxan was send should not worsen she can complete a second course.  She was to defer repeating her EGD colonoscopy at that time as she is feeling overall much better.  Today she reports she's doing okay. She did catch a "stomach bug" 3 weeks ago and vomited and has had some nausea since then. She has not taken previously prescribed xifaxin, as symptoms have been controlled. She is scheduled for shoulder surgery next week. Bowels moving overall regularly without blood per rectum or melena.

## 2025-08-25 ENCOUNTER — OFFICE VISIT (OUTPATIENT)
Dept: URBAN - METROPOLITAN AREA CLINIC 107 | Facility: CLINIC | Age: 60
End: 2025-08-25

## 2025-08-25 PROBLEM — 249517009: Status: ACTIVE | Noted: 2025-08-25

## 2025-08-25 PROBLEM — 82934008: Status: ACTIVE | Noted: 2025-08-25

## 2025-08-25 RX ORDER — CETIRIZINE HYDROCHLORIDE 10 MG/1
TAKE 1 CAPSULE DAILY AS NEEDED CAPSULE, LIQUID FILLED ORAL
Refills: 0 | Status: ON HOLD | COMMUNITY

## 2025-08-25 RX ORDER — FAMOTIDINE 20 MG/1
1 TABLET AT BEDTIME AS NEEDED TABLET, FILM COATED ORAL ONCE A DAY
Status: ON HOLD | COMMUNITY

## 2025-08-25 RX ORDER — NICOTINE 14MG/24HR
AS DIRECTED PATCH, TRANSDERMAL 24 HOURS TRANSDERMAL
Status: ON HOLD | COMMUNITY

## 2025-08-25 RX ORDER — PANTOPRAZOLE SODIUM 40 MG/1
1 TABLET 1/2 TO 1 HOUR BEFORE MORNING MEAL TABLET, DELAYED RELEASE ORAL ONCE A DAY
Status: ON HOLD | COMMUNITY

## 2025-08-25 RX ORDER — PHENTERMINE HYDROCHLORIDE 37.5 MG/1
1 TABLET BEFORE BREAKFAST TABLET ORAL ONCE A DAY
Status: ACTIVE | COMMUNITY

## 2025-08-25 RX ORDER — PANTOPRAZOLE SODIUM 40 MG/1
1 TABLET TABLET, DELAYED RELEASE ORAL ONCE A DAY
Qty: 30 TABLET | Refills: 5 | OUTPATIENT
Start: 2025-08-25

## 2025-08-25 RX ORDER — LINACLOTIDE 72 UG/1
1 CAPSULE AT LEAST 30 MINUTES BEFORE A MEAL CAPSULE, GELATIN COATED ORAL ONCE A DAY
Qty: 12 | Refills: 0
Start: 2025-08-25 | End: 2025-09-06

## 2025-08-25 RX ORDER — FAMOTIDINE 40 MG/1
1 TABLET TABLET, FILM COATED ORAL ONCE A DAY
Status: ACTIVE | COMMUNITY

## 2025-08-28 ENCOUNTER — WEB ENCOUNTER (OUTPATIENT)
Dept: URBAN - METROPOLITAN AREA CLINIC 107 | Facility: CLINIC | Age: 60
End: 2025-08-28